# Patient Record
Sex: MALE | Race: OTHER | HISPANIC OR LATINO | URBAN - METROPOLITAN AREA
[De-identification: names, ages, dates, MRNs, and addresses within clinical notes are randomized per-mention and may not be internally consistent; named-entity substitution may affect disease eponyms.]

---
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
## 2022-11-29 NOTE — ED PROVIDER NOTE - CPE EDP RESP NORM
This is a Subsequent Medicare Annual Wellness Visit providing Personalized Prevention Plan Services (PPPS) (Performed 12 months after initial AWV and PPPS )    I have reviewed the patient's medical history in detail and updated the computerized patient record. Presents today by himself. He has moved in with his daughter Kelly Nj on November 1, 2022. He has twin granddaughters who are 5years old that help him. Elected to move in with her instead of renewing his contract at [employer], due to declining health. Also has 2 sons in [place] (oldest) and in [place]. Retired .  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 Appetite is fair. Denies any recent falls but he is dizzy at times. Weight is stable . Wt Readings from Last 3 Encounters:   11/28/22 144 lb 9.6 oz (65.6 kg)   10/03/22 146 lb (66.2 kg)   09/28/22 145 lb 3.2 oz (65.9 kg)     Probable metastatic lung cancer. CT scan September 15 2022 showed 5 cm left hilar mass narrowing left anterior bronchus and occluding posterior branch of the left pulmonary artery. Also with lytic spinal lesions in thoracic spine. 2.6 cm lesion in T12 vertebrae. Possible small hepatic mets. 13 mm left lower lobe spiculated mass. He saw pulmonary and then saw me in September and elected to have no further follow-up. He states he would not want surgery, chemotherapy or radiation therapy if it were a lung cancer and is aware that metastatic cancer would unlikely to be curable. He does have a history of prostate cancer and is status post prostatectomy in 2000. He has a low but stable PSA since that time. He also history of bladder cancer in 2014 and was treated with BCG. He has been followed for both of these issues by urology Dr. Kiarra Viramontes. Trigeminal neuralgia. He has been taking duloxetine 30 mg daily which she says does help. Also taking gabapentin 3 to milligrams 3 times daily. Feels that gabapentin may be contributing to his imbalance and fatigue and sometimes dizziness. Hypertension  Hypertension ROS: taking medications as instructed, no medication side effects noted, no TIA's, no chest pain on exertion, no dyspnea on exertion, no swelling of ankles     reports that he quit smoking about 60 years ago. His smoking use included cigarettes. He has never used smokeless tobacco.    reports current alcohol use of about 5.0 standard drinks per week. BP Readings from Last 2 Encounters:   11/28/22 (!) 172/76   10/03/22 (!) 148/70       History     Past Medical History:   Diagnosis Date    Abnormal x-ray of thoracic spine 09/2022    sclerotic lytic 2.6 cm lesion involving the inferior T12 vertebra    Benign essential HTN     Bladder cancer ([facility] [address] 2013?    s/p BCGx2, resection. low grade. in [facility]. now Dr. Soniya Gruber. cystoscopy 6/2020 clear    CAD (coronary artery disease) 09/2021    Dr Cecy Brunson. Cath 9/24/21 Successful bifurcation stenting of OM1 and AV groove Lcx with 2 stent couloette technique    Closed left arm fracture     GERD (gastroesophageal reflux disease)     with hiatal hernia    Glaucoma     Dr. Valdez Scarce    Hemorrhoids     Hilar mass 09/28/2022    Dr. Samuel Sam pulmonary. Left hilar 5 cm infiltrative mass    HTN (hypertension)     Hypercholesterolemia     Microalbuminuria due to type 2 diabetes mellitus (HCC)     Prostate cancer (HCC) 2000    Dr. Soniya Gruber. s/p prostatectomy 1/2000 in [facility]. PSA \"0.4\" since then    Rosacea     Trigeminal neuralgia of right side of face     Type 2 diabetes mellitus with microalbuminuria ([facility] [address]     Vertigo        Past Surgical History:   Procedure Laterality Date    HX COLONOSCOPY  2005?     reports 2 colonoscopies    HX HEART CATHETERIZATION  09/24/2020    Successful bifurcation stenting of OM1 and AV groove Lcx with 2 stent couloette technique    HX HEART CATHETERIZATION 09/08/2020    Multivessel CAD. LCX/OM1 lesions, severe with significant calcification. Normal LVEDP. Unsuccessful PCI attempt with POBA due to calcification. HX OTHER SURGICAL  01/2020    bladder biopsy    HX PROSTATECTOMY  01/2000       Current Outpatient Medications   Medication Sig    gabapentin (NEURONTIN) 300 mg capsule Take 1 pill in AM and 2 pills at dinner (change 11/28/22)    DULoxetine (CYMBALTA) 60 mg capsule Take 1 Capsule by mouth daily. Increase 11/28/22  Indications: neuropathic pain    omeprazole (PRILOSEC) 40 mg capsule TAKE 1 CAPSULE BY MOUTH DAILY. INCREASED DOSE 11/24/21 INDICATIONS: HEARTBURN    glipiZIDE SR (GLUCOTROL XL) 2.5 mg CR tablet TAKE 1 TABLET BY MOUTH IN THE MORNING. INDICATIONS: TYPE 2 DIABETES MELLITUS    metFORMIN ER (GLUCOPHAGE XR) 500 mg tablet TAKE 2 TABLETS BY MOUTH EVERY DAY WITH DINNER    atorvastatin (LIPITOR) 40 mg tablet TAKE 1 TABLET BY MOUTH EVERY DAY    olmesartan (BENICAR) 20 mg tablet TAKE 1/2 TABLET BY MOUTH EVERY DAY    dorzolamide-timolol (COSOPT) 22.3-6.8 mg/mL ophthalmic solution APPLY 1 DROP(S) IN BOTH EYES 2 TIMES A DAY    latanoprost (XALATAN) 0.005 % ophthalmic solution APPLY 1 DROP(S) IN LEFT EYE AT BEDTIME    cholecalciferol (VITAMIN D3) (1000 Units /25 mcg) tablet Take  by mouth daily. cyanocobalamin (VITAMIN B12) 500 mcg tablet Take 500 mcg by mouth daily. aspirin delayed-release (ASPIR-81) 81 mg tablet Take 1 Tab by mouth daily. No current facility-administered medications for this visit. Allergies   Allergen Reactions    Lisinopril Cough       Family History   Problem Relation Age of Onset    Cancer Mother         breast    Cancer Father         prostate    Cancer Sister         breast    Cancer Brother         prostate        reports that he quit smoking about 60 years ago. His smoking use included cigarettes. He has never used smokeless tobacco.   reports current alcohol use of about 5.0 standard drinks per week.       Depression Risk Factor Screening:       Alcohol Risk Factor Screening: On any occasion during the past 3 months, have you had more than 3 drinks containing alcohol? No    Do you average more than 14 drinks per week? No      Functional Ability and Level of Safety:     Hearing Loss   mild    Activities of Daily Living   Self-care. Requires assistance with: no ADLs    Fall Risk     Fall Risk Assessment, last 12 mths 7/25/2022   Able to walk? Yes   Fall in past 12 months? 1   Do you feel unsteady? -   Are you worried about falling -   Number of falls in past 12 months 1   Fall with injury? 0         Abuse Screen   Patient is not abused    Review of Systems   A comprehensive review of systems was negative except for that written in the HPI. Physical Examination     Evaluation of Cognitive Function:  Mood/affect:  neutral, happy  Appearance: age appropriate  Family member/caregiver input: none    Blood pressure (!) 172/76, pulse 65, temperature 98.2 °F (36.8 °C), temperature source Oral, resp. rate 15, height 5' 9\" (1.753 m), weight 144 lb 9.6 oz (65.6 kg), SpO2 99 %. General appearance: Pleasant, appears somewhat disheveled. Neck: supple, symmetrical, trachea midline, no adenopathy, thyroid: not enlarged, symmetric, no tenderness/mass/nodules, no carotid bruit and no JVD  Lungs: clear to auscultation bilaterally  Heart: regular rate and rhythm, S1, S2 normal, no murmur, click, rub or gallop  Extremities: extremities normal, atraumatic, no cyanosis or edema  Ambulating with mildly swaying gait. Patient Care Team:  Ben Guerrero MD as PCP - General (Internal Medicine Physician)  Ben Guerrero MD as PCP [facility]  Aj Santillan MD as Physician (Urology)      Advice/Referrals/Counseling   Education and counseling provided. See below for specific orders    Assessment/Plan     Diagnoses and all orders for this visit:    1. Medicare annual wellness visit, subsequent  ACP reviewed.  Primary medical decision maker reviewed with patient and updated in chart. he is otherwise up-to-date or have declined preventative services except for those ordered below. Code status discussion indicated    2. Dizziness  Increasing dizziness at times. MRI of the brain July 20, 2022 was normal.  Could consider repeat imaging. Keep hydrated. Decrease gabapentin during the day. He may become somewhat of a fall risk. Fall may increase morbidity and mortality    3. Type 2 diabetes with nephropathy ([facility] [address]  4. Type 2 diabetes mellitus with microalbuminuria, without long-term current use of insulin (HCC)  Likely well-controlled. May consider stopping glipizide due to risk of hypoglycemia. Continue metformin. Aggressive control not indicated. -     MICROALBUMIN, UR, RAND W/ MICROALB/CREAT RATIO; Future  -     LIPID PANEL; Future  -     CBC WITH AUTOMATED DIFF; Future  -     METABOLIC PANEL, COMPREHENSIVE; Future  -     HEMOGLOBIN A1C WITH EAG; Future    5. Hilar mass   Spinal, lung, liver? Masses  He is aware that he may have metastatic cancer which will be life limiting. I suspect that he has 6-12 months. Refer to hospice anytime. Declines any further evaluation or treatment which I think is a very reasonable and rational decision given advanced disease, debility, age. 6. Malignant neoplasm of urinary bladder, unspecified site [facility])  Currently asymptomatic  This condition appears to be stable and is being evaluated and managed by his specialist Dr. Celso Potter. No acute findings today warrant change in management plan. 7. Trigeminal neuralgia of right side of face  Pain levels are reasonably well controlled with gabapentin 3 times daily, but I suspect side effects of fatigue and dizziness. We will decrease gabapentin during the day and increase Cymbalta. Cymbalta also may help with mood, anxiety, pain. -     gabapentin (NEURONTIN) 300 mg capsule;  Take 1 pill in AM and 2 pills at dinner (change 11/28/22)  - DULoxetine (CYMBALTA) 60 mg capsule; Take 1 Capsule by mouth daily. Increase 11/28/22  Indications: neuropathic pain    8. Paresthesia of skin  -     VITAMIN B12; Future    9. Urinary frequency  Unclear current status. Check urinalysis and culture. -     URINALYSIS W/ RFLX MICROSCOPIC; Future  -     CULTURE, URINE; Future    10. HTN  Blood pressure result in the clinic today. Blood pressure not repeated. Need to monitor. Cont olmesartan    Potential medication side effects were discussed with the patient; let me know if any occur.   Return for yearly Annual Wellness Visits normal (ped)...

## 2023-08-30 ENCOUNTER — EMERGENCY (EMERGENCY)
Facility: HOSPITAL | Age: 23
LOS: 1 days | Discharge: HOME | End: 2023-08-30
Attending: STUDENT IN AN ORGANIZED HEALTH CARE EDUCATION/TRAINING PROGRAM
Payer: COMMERCIAL

## 2023-08-30 VITALS
WEIGHT: 160.94 LBS | RESPIRATION RATE: 18 BRPM | TEMPERATURE: 99 F | OXYGEN SATURATION: 96 % | SYSTOLIC BLOOD PRESSURE: 149 MMHG | HEART RATE: 81 BPM | DIASTOLIC BLOOD PRESSURE: 68 MMHG

## 2023-08-30 PROCEDURE — 12001 RPR S/N/AX/GEN/TRNK 2.5CM/<: CPT

## 2023-08-30 PROCEDURE — 99284 EMERGENCY DEPT VISIT MOD MDM: CPT | Mod: 25

## 2023-08-31 PROCEDURE — 90471 IMMUNIZATION ADMIN: CPT

## 2023-08-31 PROCEDURE — 12001 RPR S/N/AX/GEN/TRNK 2.5CM/<: CPT

## 2023-08-31 PROCEDURE — 90715 TDAP VACCINE 7 YRS/> IM: CPT

## 2023-08-31 PROCEDURE — 99283 EMERGENCY DEPT VISIT LOW MDM: CPT | Mod: 25

## 2023-08-31 PROCEDURE — 73130 X-RAY EXAM OF HAND: CPT

## 2023-08-31 PROCEDURE — 73130 X-RAY EXAM OF HAND: CPT | Mod: 26,RT

## 2023-08-31 RX ORDER — LIDOCAINE HCL 20 MG/ML
5 VIAL (ML) INJECTION ONCE
Refills: 0 | Status: COMPLETED | OUTPATIENT
Start: 2023-08-31 | End: 2023-08-31

## 2023-08-31 RX ORDER — ACETAMINOPHEN 500 MG
650 TABLET ORAL ONCE
Refills: 0 | Status: COMPLETED | OUTPATIENT
Start: 2023-08-31 | End: 2023-08-31

## 2023-08-31 RX ORDER — IBUPROFEN 200 MG
600 TABLET ORAL ONCE
Refills: 0 | Status: COMPLETED | OUTPATIENT
Start: 2023-08-31 | End: 2023-08-31

## 2023-08-31 RX ORDER — TETANUS TOXOID, REDUCED DIPHTHERIA TOXOID AND ACELLULAR PERTUSSIS VACCINE, ADSORBED 5; 2.5; 8; 8; 2.5 [IU]/.5ML; [IU]/.5ML; UG/.5ML; UG/.5ML; UG/.5ML
0.5 SUSPENSION INTRAMUSCULAR ONCE
Refills: 0 | Status: COMPLETED | OUTPATIENT
Start: 2023-08-31 | End: 2023-08-31

## 2023-08-31 RX ADMIN — Medication 5 MILLILITER(S): at 01:36

## 2023-08-31 RX ADMIN — Medication 600 MILLIGRAM(S): at 00:50

## 2023-08-31 RX ADMIN — Medication 650 MILLIGRAM(S): at 00:51

## 2023-08-31 RX ADMIN — TETANUS TOXOID, REDUCED DIPHTHERIA TOXOID AND ACELLULAR PERTUSSIS VACCINE, ADSORBED 0.5 MILLILITER(S): 5; 2.5; 8; 8; 2.5 SUSPENSION INTRAMUSCULAR at 00:51

## 2023-08-31 NOTE — ED ADULT NURSE NOTE - NSFALLUNIVINTERV_ED_ALL_ED
Bed/Stretcher in lowest position, wheels locked, appropriate side rails in place/Call bell, personal items and telephone in reach/Instruct patient to call for assistance before getting out of bed/chair/stretcher/Non-slip footwear applied when patient is off stretcher/[place] to call system/Physically safe environment - no spills, clutter or unnecessary equipment/Purposeful proactive rounding/Room/bathroom lighting operational, light cord in reach

## 2023-08-31 NOTE — ED PROVIDER NOTE - ATTENDING APP SHARED VISIT CONTRIBUTION OF CARE
23y M presents for R thumb laceration sustained from broken glass. No FB on XR. Tetanus updated, laceration repaired. No signs of nerve/tendon injury. Discharged in stable condition.

## 2023-08-31 NOTE — ED PROVIDER NOTE - PATIENT PORTAL LINK FT
You can access the FollowMyHealth Patient Portal offered by [facility] by registering at the following website: [email or web address]. By joining [employer]’s FollowMyHealth portal, you will also be able to view your health information using other applications (apps) compatible with our system.

## 2023-08-31 NOTE — ED ADULT NURSE NOTE - OBJECTIVE STATEMENT
Pt AOx3. Pt presents to ED with laceration to right 1st digit. Pt denies PM hx. VSS. Tdap administered per orders-see mar. Bed locked lowest position.

## 2023-08-31 NOTE — ED PROVIDER NOTE - NSFOLLOWUPINSTRUCTIONS_ED_ALL_ED_FT
Return in 7-10 days for suture removal.   Keep wound clean and dry.   Return for any worsening symptoms.     Laceration    A laceration is a cut that goes through all of the layers of the skin and into the tissue that is right under the skin. Some lacerations heal on their own. Others need to be closed with skin adhesive strips, skin glue, stitches (sutures), or staples. Proper laceration care minimizes the risk of infection and helps the laceration to heal better.  If non-absorbable stitches or staples have been placed, they must be taken out within the time frame instructed by your healthcare provider.    SEEK IMMEDIATE MEDICAL CARE IF YOU HAVE ANY OF THE FOLLOWING SYMPTOMS: swelling around the wound, worsening pain, drainage from the wound, red streaking going away from your wound, inability to move finger or toe near the laceration, or discoloration of skin near the laceration.

## 2023-08-31 NOTE — ED PROVIDER NOTE - OBJECTIVE STATEMENT
23M with no pmh presenting with lac to right 1st digit when picking up broken glass.   Pt is unsure of last tdap. 23M with no pmh presenting with lac to right 1st digit when picking up broken glass from a mirror. Denies feeling any residual glass in the wound. States the mirror was clean.   Pt is unsure of last tdap.

## 2023-08-31 NOTE — ED PROVIDER NOTE - NS ED ATTENDING STATEMENT MOD
This was a shared visit with the JOVANI. I reviewed and verified the documentation and independently performed the documented:

## 2023-08-31 NOTE — ED PROVIDER NOTE - CLINICAL SUMMARY MEDICAL DECISION MAKING FREE TEXT BOX
23M with 1cm lac to dorsal DIP. XR neg.   placed 3 sutures. pt tolerated well. To return for suture removal. Given return precautions.

## 2023-08-31 NOTE — ED ADULT NURSE NOTE - NSFALLLASTSIX_ED_ALL_ED
No. Subsequent Stages Histo Method Verbiage: Using a similar technique to that described above, a thin layer of tissue was removed from all areas where tumor was visible on the previous stage.  The tissue was again oriented, mapped, dyed, and processed as above.

## 2023-08-31 NOTE — ED PROVIDER NOTE - PHYSICAL EXAMINATION
Gen: No acute distress, non toxic  MSK/skin: 1cm lac to dorsal DIP. FROM. sensation intact. bleeding controlled.

## 2023-09-07 ENCOUNTER — EMERGENCY (EMERGENCY)
Facility: HOSPITAL | Age: 23
LOS: 1 days | Discharge: HOME | End: 2023-09-07
Attending: EMERGENCY MEDICINE
Payer: COMMERCIAL

## 2023-09-07 VITALS
TEMPERATURE: 98 F | HEART RATE: 63 BPM | OXYGEN SATURATION: 99 % | SYSTOLIC BLOOD PRESSURE: 120 MMHG | RESPIRATION RATE: 16 BRPM | DIASTOLIC BLOOD PRESSURE: 65 MMHG

## 2023-09-07 PROCEDURE — L9995: CPT

## 2023-09-07 PROCEDURE — G0463: CPT

## 2023-09-07 PROCEDURE — T1013: CPT

## 2023-09-07 NOTE — ED PROVIDER NOTE - CLINICAL SUMMARY MEDICAL DECISION MAKING FREE TEXT BOX
PT with no SPMHx presents to the ED with complaint of for removal of suture placed in [facility] x7 days ago. Pt states that he followed al dc instructions has no acute complaint at this time. Pt dines fever, chills, weakness, Ha, dizziness, OSB, diff breathing, pain, swelling, redness, discharge from wound. ON exam pt with clean dry intact healed wound on the Rt thumb. Suture removed wo incident. Pt to be dc home with supportive care, follow up to PCP, educated about when to return to the ED if needed. PT verbalizes that he understands all instructions and results. Pt informed that ED is open and available 24/7 365 days a yr, encouraged to return to the ED if they have any change in condition, or feel the need for revaluation.    utilized to obtain History, ROS, Physical Exam, explanations of results and plan of care, as well as follow up instructions.

## 2023-09-07 NOTE — ED PROVIDER NOTE - OBJECTIVE STATEMENT
PT with no SPMHx presents to the ED with complaint of for removal of suture placed in [facility] x7 days ago. Pt states that he followed al dc instructions has no acute complaint at this time. Pt dines fever, chills, weakness, Ha, dizziness, OSB, diff breathing, pain, swelling, redness, discharge from wound.

## 2023-09-07 NOTE — ED PROVIDER NOTE - SKIN, MLM
Skin normal color for race, warm, dry a No evidence of rash. clean dry intact healed wound on the Rt thumb

## 2023-09-07 NOTE — ED PROVIDER NOTE - ADDITIONAL NOTES AND INSTRUCTIONS:
PT was evaluated At [facility] ED and was found to have a condition that warranted time of to rest and heal from WORK/SCHOOL.   Saran Tong PA-C

## 2023-09-07 NOTE — ED PROVIDER NOTE - PATIENT PORTAL LINK FT
You can access the FollowMyHealth Patient Portal offered by [facility] by registering at the following website: [email or web address]. By joining [employer]’s FollowMyHealth portal, you will also be able to view your health information using other applications (apps) compatible with our system.

## 2023-09-07 NOTE — ED PROVIDER NOTE - NSFOLLOWUPINSTRUCTIONS_ED_ALL_ED_FT
Patient education: Removing stitches (The Basics)  Written by the doctors and editors at [facility]  Please read the Disclaimer at the end of this page.    What are stitches?  Stitches are a way doctors can close certain types of cuts. A doctor uses a special needle and thread to put in stitches. They sew the edges of the cut together and tie knots to hold the stitches in place (figure 1). The term doctors use for stitches is "sutures."    There are 2 main types of stitches:    ?Absorbable – These stitches dissolve over time. They do not need to be taken out.    ?Non-absorbable – These stitches need to be taken out after a certain amount of time. They do not dissolve.    This article is about removing non-absorbable stitches.    How are stitches removed?  Stitches are removed by a doctor or nurse. Usually, stitches are taken out 5 to 14 days after they were put in, depending on where they are.    When it is time for your stitches to be removed, the doctor or nurse will check your cut to make sure that it is healing well.    To remove each stitch, the doctor or nurse will:    ?Use a special tool to gently pull up on the knot in the stitch    ?Use small, sharp scissors or a sharp blade to cut the stitch    ?Gently pull the stitch out of the skin    You might be able to feel some pressure or tugging. But having stitches removed should not hurt.    When all of the stitches are out, the doctor or nurse will clean the cut. They might put a bandage, dressing, or small sticky strips over the area to protect it.    How do I care for myself at home?  Ask the doctor or nurse what you should do when you go home. Make sure that you understand exactly what you need to do to care for yourself. Ask questions if there is anything you do not understand.    You should also:    ?If you have sticky strips on your cut, leave them on until they fall off, or until your doctor or nurse says to take them off.    ?Wash your hands before and after touching your cut.    ?Follow your doctor or nurse's instructions about:    •When you should clean the wound and how to change your bandages or dressing, if needed    •Physical activity    ?Take care of your scar:    •Protect the area from the sun – Always use sunscreen or wear clothes or a hat that cover the scar.    •Your doctor or nurse might also recommend using a special lotion or cream to help your scar heal.    When should I call the doctor?  Call for advice if:    ?You have signs of infection – These include:    •Swelling, redness, or warmth around the cut    •Yellow, green, or bloody discharge from the cut    •Bad smell coming from the cut    •The cut becomes painful    ?Your cut opens up again.

## 2023-12-08 ENCOUNTER — EMERGENCY (EMERGENCY)
Facility: HOSPITAL | Age: 23
LOS: 1 days | Discharge: LEFT BEFORE DISCHARGE | End: 2023-12-08
Attending: EMERGENCY MEDICINE
Payer: COMMERCIAL

## 2023-12-08 VITALS
HEART RATE: 67 BPM | DIASTOLIC BLOOD PRESSURE: 75 MMHG | OXYGEN SATURATION: 99 % | RESPIRATION RATE: 16 BRPM | SYSTOLIC BLOOD PRESSURE: 116 MMHG | HEIGHT: 65 IN | TEMPERATURE: 98 F | WEIGHT: 164.91 LBS

## 2023-12-08 LAB
APPEARANCE UR: CLEAR — SIGNIFICANT CHANGE UP
APPEARANCE UR: CLEAR — SIGNIFICANT CHANGE UP
BILIRUB UR-MCNC: NEGATIVE — SIGNIFICANT CHANGE UP
BILIRUB UR-MCNC: NEGATIVE — SIGNIFICANT CHANGE UP
COLOR SPEC: YELLOW — SIGNIFICANT CHANGE UP
COLOR SPEC: YELLOW — SIGNIFICANT CHANGE UP
DIFF PNL FLD: NEGATIVE — SIGNIFICANT CHANGE UP
DIFF PNL FLD: NEGATIVE — SIGNIFICANT CHANGE UP
GLUCOSE UR QL: NEGATIVE MG/DL — SIGNIFICANT CHANGE UP
GLUCOSE UR QL: NEGATIVE MG/DL — SIGNIFICANT CHANGE UP
KETONES UR-MCNC: NEGATIVE MG/DL — SIGNIFICANT CHANGE UP
KETONES UR-MCNC: NEGATIVE MG/DL — SIGNIFICANT CHANGE UP
LEUKOCYTE ESTERASE UR-ACNC: NEGATIVE — SIGNIFICANT CHANGE UP
LEUKOCYTE ESTERASE UR-ACNC: NEGATIVE — SIGNIFICANT CHANGE UP
NITRITE UR-MCNC: NEGATIVE — SIGNIFICANT CHANGE UP
NITRITE UR-MCNC: NEGATIVE — SIGNIFICANT CHANGE UP
PH UR: 7 — SIGNIFICANT CHANGE UP (ref 5–8)
PH UR: 7 — SIGNIFICANT CHANGE UP (ref 5–8)
PROT UR-MCNC: NEGATIVE MG/DL — SIGNIFICANT CHANGE UP
PROT UR-MCNC: NEGATIVE MG/DL — SIGNIFICANT CHANGE UP
SP GR SPEC: 1.02 — SIGNIFICANT CHANGE UP (ref 1–1.03)
SP GR SPEC: 1.02 — SIGNIFICANT CHANGE UP (ref 1–1.03)
UROBILINOGEN FLD QL: 1 MG/DL — SIGNIFICANT CHANGE UP (ref 0.2–1)
UROBILINOGEN FLD QL: 1 MG/DL — SIGNIFICANT CHANGE UP (ref 0.2–1)

## 2023-12-08 PROCEDURE — 99284 EMERGENCY DEPT VISIT MOD MDM: CPT

## 2023-12-08 PROCEDURE — T1013: CPT

## 2023-12-08 PROCEDURE — 87086 URINE CULTURE/COLONY COUNT: CPT

## 2023-12-08 PROCEDURE — 81003 URINALYSIS AUTO W/O SCOPE: CPT

## 2023-12-08 RX ORDER — IBUPROFEN 200 MG
800 TABLET ORAL ONCE
Refills: 0 | Status: COMPLETED | OUTPATIENT
Start: 2023-12-08 | End: 2023-12-08

## 2023-12-08 RX ORDER — LIDOCAINE 4 G/100G
1 CREAM TOPICAL ONCE
Refills: 0 | Status: COMPLETED | OUTPATIENT
Start: 2023-12-08 | End: 2023-12-08

## 2023-12-08 RX ORDER — CYCLOBENZAPRINE HYDROCHLORIDE 10 MG/1
10 TABLET, FILM COATED ORAL ONCE
Refills: 0 | Status: COMPLETED | OUTPATIENT
Start: 2023-12-08 | End: 2023-12-08

## 2023-12-08 RX ADMIN — Medication 800 MILLIGRAM(S): at 15:03

## 2023-12-08 RX ADMIN — LIDOCAINE 1 PATCH: 4 CREAM TOPICAL at 15:04

## 2023-12-08 RX ADMIN — CYCLOBENZAPRINE HYDROCHLORIDE 10 MILLIGRAM(S): 10 TABLET, FILM COATED ORAL at 15:03

## 2023-12-08 NOTE — ED PROVIDER NOTE - OBJECTIVE STATEMENT
24 yo male  denies PMHx p/w 3 days of worsening pain to left lower back, now radiating to LLE and left ingiunal region; constant, but worsening at times with sharp acute exacerbations of pain; now with increased pain with walking; no incontinence; states he was told he had kidney stones once, but didn't require any treatment; no fever, no dysuria, no nausea/vomiting.

## 2023-12-08 NOTE — ED ADULT TRIAGE NOTE - CHIEF COMPLAINT QUOTE
3 days ago patient developed swelling and pain in his left testicle, pain in the left side of his back and left leg. At the same time patient also started to develop painful, itchy blisters to right shoulder.

## 2023-12-08 NOTE — ED ADULT NURSE NOTE - OBJECTIVE STATEMENT
py A&Ox4, c/o lower back pain that radiates down left leg and having intermittent bilat leg numbness/tingling, pt denies trauma but works as a , resp even and unlabored no distress noted, and also has rash to right shoulder denies fever

## 2023-12-08 NOTE — ED PROVIDER NOTE - CLINICAL SUMMARY MEDICAL DECISION MAKING FREE TEXT BOX
left back/flank pain with assoc inguinal pain; no PE abnormalities noted to scrotum or abd; suspect MSK vs renal stone; will treat, check UA, and check CT

## 2023-12-08 NOTE — ED PROVIDER NOTE - GENITOURINARY, MLM
uncircumcized; no d/c, no lesions; no scrotal lesions, no tesitcular ttp; no ingional hernia (chaperone juventino)

## 2023-12-09 LAB
CULTURE RESULTS: SIGNIFICANT CHANGE UP
CULTURE RESULTS: SIGNIFICANT CHANGE UP
SPECIMEN SOURCE: SIGNIFICANT CHANGE UP
SPECIMEN SOURCE: SIGNIFICANT CHANGE UP
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
